# Patient Record
Sex: MALE | Race: WHITE | Employment: FULL TIME | URBAN - METROPOLITAN AREA
[De-identification: names, ages, dates, MRNs, and addresses within clinical notes are randomized per-mention and may not be internally consistent; named-entity substitution may affect disease eponyms.]

---

## 2020-04-07 ENCOUNTER — TRANSFERRED RECORDS (OUTPATIENT)
Dept: HEALTH INFORMATION MANAGEMENT | Facility: CLINIC | Age: 40
End: 2020-04-07

## 2020-10-06 ENCOUNTER — TRANSFERRED RECORDS (OUTPATIENT)
Dept: HEALTH INFORMATION MANAGEMENT | Facility: CLINIC | Age: 40
End: 2020-10-06

## 2021-04-08 ENCOUNTER — TRANSFERRED RECORDS (OUTPATIENT)
Dept: HEALTH INFORMATION MANAGEMENT | Facility: CLINIC | Age: 41
End: 2021-04-08

## 2021-04-14 ENCOUNTER — TRANSFERRED RECORDS (OUTPATIENT)
Dept: HEALTH INFORMATION MANAGEMENT | Facility: CLINIC | Age: 41
End: 2021-04-14

## 2021-04-15 ENCOUNTER — TRANSFERRED RECORDS (OUTPATIENT)
Dept: HEALTH INFORMATION MANAGEMENT | Facility: CLINIC | Age: 41
End: 2021-04-15

## 2021-04-15 LAB — TSH SERPL-ACNC: 2.58 UIU/ML (ref 0.35–4.94)

## 2021-04-16 LAB
ALT SERPL-CCNC: 50 IU/L (ref 8–45)
AST SERPL-CCNC: 30 IU/L (ref 2–40)
CREAT SERPL-MCNC: 0.87 MG/DL (ref 0.72–1.25)
GFR SERPL CREATININE-BSD FRML MDRD: >60 ML/MIN/1.73M2
HIV 1&2 EXT: NORMAL
POTASSIUM SERPL-SCNC: 4.3 MMOL/L (ref 3.5–5)

## 2021-04-17 ENCOUNTER — TRANSFERRED RECORDS (OUTPATIENT)
Dept: HEALTH INFORMATION MANAGEMENT | Facility: CLINIC | Age: 41
End: 2021-04-17

## 2021-04-17 LAB — GLUCOSE SERPL-MCNC: 57 MG/DL (ref 40–70)

## 2021-04-20 ENCOUNTER — TRANSFERRED RECORDS (OUTPATIENT)
Dept: HEALTH INFORMATION MANAGEMENT | Facility: CLINIC | Age: 41
End: 2021-04-20

## 2021-04-21 ENCOUNTER — TRANSFERRED RECORDS (OUTPATIENT)
Dept: HEALTH INFORMATION MANAGEMENT | Facility: CLINIC | Age: 41
End: 2021-04-21

## 2021-04-23 ENCOUNTER — TRANSFERRED RECORDS (OUTPATIENT)
Dept: HEALTH INFORMATION MANAGEMENT | Facility: CLINIC | Age: 41
End: 2021-04-23

## 2021-05-22 ENCOUNTER — TRANSFERRED RECORDS (OUTPATIENT)
Dept: HEALTH INFORMATION MANAGEMENT | Facility: CLINIC | Age: 41
End: 2021-05-22

## 2021-05-23 ENCOUNTER — PRE VISIT (OUTPATIENT)
Dept: NEUROLOGY | Facility: CLINIC | Age: 41
End: 2021-05-23

## 2021-05-23 NOTE — TELEPHONE ENCOUNTER
FUTURE VISIT INFORMATION      FUTURE VISIT INFORMATION:    Date: 6/4/2021    Time: 745am    [place] [facility] [place]:    Referring provider:  Self     Referring providers clinic:      Reason for visit/diagnosis  Numbness and Weakness     RECORDS REQUESTED FROM:       Clinic name Comments Records Status Imaging Status   Internal Dr. Jasso-4/19/2021, 4/14/2021 Epic N/A          [facility]-4/15/2021, 3/11/2021 Care Everywhere Requested to PACS                        5/23/2021-Voicemail left for [facility] to push IMAGES ASAP-MR @ 1034am    6/3/2021-2nd request for Images faxed to [facility]-MR @ 647am    6/4/2021-[facility] Images now in PACS, labs scanned to Chart-MR @ 438am

## 2021-05-25 ENCOUNTER — TRANSFERRED RECORDS (OUTPATIENT)
Dept: HEALTH INFORMATION MANAGEMENT | Facility: CLINIC | Age: 41
End: 2021-05-25

## 2021-05-26 ENCOUNTER — TRANSFERRED RECORDS (OUTPATIENT)
Dept: HEALTH INFORMATION MANAGEMENT | Facility: CLINIC | Age: 41
End: 2021-05-26

## 2021-06-02 ENCOUNTER — TRANSFERRED RECORDS (OUTPATIENT)
Dept: HEALTH INFORMATION MANAGEMENT | Facility: CLINIC | Age: 41
End: 2021-06-02

## 2021-06-04 ENCOUNTER — OFFICE VISIT (OUTPATIENT)
Dept: NEUROLOGY | Facility: CLINIC | Age: 41
End: 2021-06-04
Payer: COMMERCIAL

## 2021-06-04 VITALS
SYSTOLIC BLOOD PRESSURE: 125 MMHG | OXYGEN SATURATION: 97 % | HEIGHT: 72 IN | RESPIRATION RATE: 16 BRPM | HEART RATE: 63 BPM | WEIGHT: 179 LBS | DIASTOLIC BLOOD PRESSURE: 80 MMHG | BODY MASS INDEX: 24.24 KG/M2

## 2021-06-04 DIAGNOSIS — R47.1 DYSARTHRIA: Primary | ICD-10-CM

## 2021-06-04 DIAGNOSIS — R13.10 DYSPHAGIA, UNSPECIFIED TYPE: ICD-10-CM

## 2021-06-04 DIAGNOSIS — R20.0 NUMBNESS OF FACE: ICD-10-CM

## 2021-06-04 DIAGNOSIS — R53.1 LEFT-SIDED WEAKNESS: ICD-10-CM

## 2021-06-04 PROCEDURE — 99205 OFFICE O/P NEW HI 60 MIN: CPT | Performed by: PSYCHIATRY & NEUROLOGY

## 2021-06-04 RX ORDER — BENZONATATE 100 MG/1
100 CAPSULE ORAL
COMMUNITY
Start: 2021-05-12

## 2021-06-04 RX ORDER — QUETIAPINE 150 MG/1
150 TABLET, FILM COATED, EXTENDED RELEASE ORAL
COMMUNITY
Start: 2021-04-22

## 2021-06-04 RX ORDER — QUETIAPINE FUMARATE 300 MG/1
300 TABLET, FILM COATED ORAL
COMMUNITY
Start: 2021-04-22

## 2021-06-04 RX ORDER — HYDROCODONE BITARTRATE AND ACETAMINOPHEN 5; 325 MG/1; MG/1
1 TABLET ORAL
COMMUNITY
Start: 2021-06-02

## 2021-06-04 RX ORDER — METOPROLOL SUCCINATE 100 MG/1
100 TABLET, EXTENDED RELEASE ORAL
COMMUNITY
Start: 2021-05-12

## 2021-06-04 RX ORDER — LITHIUM CARBONATE 450 MG
900 TABLET, EXTENDED RELEASE ORAL
COMMUNITY
Start: 2021-03-11

## 2021-06-04 RX ORDER — ATORVASTATIN CALCIUM 20 MG/1
20 TABLET, FILM COATED ORAL
COMMUNITY
Start: 2021-05-01

## 2021-06-04 RX ORDER — DEXTROAMPHETAMINE SACCHARATE, AMPHETAMINE ASPARTATE MONOHYDRATE, DEXTROAMPHETAMINE SULFATE AND AMPHETAMINE SULFATE 3.75; 3.75; 3.75; 3.75 MG/1; MG/1; MG/1; MG/1
15 CAPSULE, EXTENDED RELEASE ORAL
COMMUNITY
Start: 2021-05-07 | End: 2021-06-06

## 2021-06-04 RX ORDER — LORAZEPAM 1 MG/1
1-2 TABLET ORAL
COMMUNITY
Start: 2021-04-05

## 2021-06-04 RX ORDER — DEXTROAMPHETAMINE SACCHARATE, AMPHETAMINE ASPARTATE MONOHYDRATE, DEXTROAMPHETAMINE SULFATE AND AMPHETAMINE SULFATE 6.25; 6.25; 6.25; 6.25 MG/1; MG/1; MG/1; MG/1
25 CAPSULE, EXTENDED RELEASE ORAL
COMMUNITY
Start: 2021-05-07 | End: 2021-06-06

## 2021-06-04 ASSESSMENT — MIFFLIN-ST. JEOR: SCORE: 1759.94

## 2021-06-04 ASSESSMENT — PAIN SCALES - GENERAL: PAINLEVEL: EXTREME PAIN (9)

## 2021-06-04 NOTE — PROGRESS NOTES
Service Date: 2021    Carlotta Rosenberg MD  [id number] [place]  [address]    RE:  Joe Poole  MRN:  [id number]  :  1980    Dear Carlotta:    Thank you for referring Joe Poole.  He is a 41-year-old male who presents with a number of symptoms including dysarthria, dysphagia, facial numbness and left sided weakness.  He is accompanied by his wife.    Prior to his visit with me this morning,  I did have a chance to review your records as well as extensive testing that has been done over the past few months.    He tells me in December he began experiencing pain in his ears.  He was prescribed eardrops for this.  Subsequent to this, he developed bilateral facial numbness that has persisted.  At the end of March, he had contracted COVID and indicates he was unable to walk.  He has also developed slurring of speech, dysphagia, weakness on the left side and left foot numbness.    Also, recently with no clear provocation, he developed a medial epicondyle fracture at the left elbow.    In addition to the above symptoms, his wife gave me detailed notes of other things he has been experiencing including ear pain and ringing, urinary urgency, low-grade fevers, eye pain, facial twitching, tongue tremor, arm and leg tremors, slowed thinking, choking on food, sexual dysfunction, tripping and falling requiring a cane for safe walking, cramps in his legs, numbness in his left foot, she reports he has had pulmonary function testing showing reduced inspiratory pressures.  He has been fatigued.  He has had mood instability.  He has had trouble manipulating things with his hands and he feels like he might pass out.    I do note he has had extensive and essentially unrevealing testing.      He had an unenhanced brain MRI scan in March which I reviewed, and aside from a few nonspecific T2 signal abnormalities, the study was normal.  He had a followup study done in April with contrast that  did not reveal any difference and no abnormal contrast enhancement was noted.    He had a lumbar puncture done, which was essentially normal.  He had 1 white cell.  His protein was 46, glucose 57.    Your report indicates he has had EMG testing that was negative for myopathy and neuropathy.  He indicates that the study was done on his right arm and right leg.    He had a video swallow study done on 04/15/2021.  It was reported his swallow was coordinated.  He coughed with each swallow, but there was no penetration or aspiration.  He had 1 episode of penetration for thin liquids that was likely due to reflux at the cessation of swallowing.    He has had extensive laboratory work, which was largely normal or negative.  This included a CBC, basic metabolic panel, liver profile, Lyme serology, treponema pallidum antibody, GARY, Sjogren antibodies, HIV, B12, TSH, sedimentation rate, CRP, ganglioside antibodies, paraneoplastic panel, and acetylcholine receptor antibodies.  On one occasion, he had a mildly elevated CK of 353.    His past history is notable for bipolar disorder, ADHD, hypertension, hypertriglyceridemia and methamphetamine abuse.  He has been sober for 8 years.    CURRENT MEDICATIONS:  Adderall, Lipitor, Tessalon, hydrocodone for the recent left elbow fracture, lithium, lorazepam, metoprolol, and quetiapine.  Aspirin that he had been taking is currently being held due to ecchymosis in the left arm.    PHYSICAL EXAMINATION:  Reveals a patient who is alert and cooperative.  Heart rate 63.  Blood pressure 125/80.  He has extensive bruising and swelling of the left forearm related to the recent fracture.    The patient does have a slight facial stare.  Pupils are equal, round and react well to light.  He has full extraocular motility.  He reports decreased touch and pin involving his entire face and up onto the forehead and the top of his head that could correspond to bilateral trigeminal distributions V1, V2 and  V3.  His facial strength is normal.  His speech is somewhat indistinct, but I cannot clearly say he is dysarthric.  His palate moves in the midline, but he has a diminished gag.  He has a tremor of his tongue, but no tongue atrophy or fasciculations.  Motor examination reveals normal right upper and right lower extremity strength.  It was difficult to test the left upper extremity because of the recent fracture, but he  with a reduced intensity with the left hand.  He has a tendency to give way when I test left lower extremity strength.  He is not ataxic.  He reports decreased vibratory sense in the toes on the left and decreased pinprick involving the entire left leg.  He misidentifies position change of his left great toe.  No limb tremor is appreciated.  There is no alteration of his muscle tone.  His gait is antalgic and he does use a cane.  Reflexes are 2+ in the upper extremities, 3+ in the knees and 2+ at the ankles.  Plantar responses are flexor.    IMPRESSION:    1.  Multiple neurologic symptoms including dysarthria, dysphagia, left sided weakness, left lower extremity sensory disturbance, bilateral trigeminal sensory disturbance, and tongue tremor.  2.  Extensive testing to date which has been negative.    PLAN:  I did inform the patient and his wife that at this juncture, I do not have a precise diagnosis for all of his symptoms.    His wife asked me about ALS, Guillain-[place] syndrome, and myasthenia gravis and I informed her that his history and examination, and testing to date would not support any of these diagnoses.    I have recommended repeating his brain MRI scan with contrast to look and see if we can find any evolving process perhaps in the brainstem or at the skull base that might explain some of his symptoms.    He is going to have a left lower extremity EMG examination to again look for any evidence of a peripheral nervous system process.  I am not going to study the left arm because of  the recent fracture.    I did tell them I may not be able to come up with a specific diagnosis.    I will be contacting the patient with test results.  I did tell them I am going to be retiring in a few weeks, and if I do not have the test results by then, one of my colleagues will be contacting them.    Total visit time was 65 minutes.  This included reviewing extensive medical records prior to his visit, history, examination, counseling, and documentation.    ADDENDUM 6/10/21: Head MRI unremarkable. Few T2 hyperintensities. No abnormal enhancement. Brainstem/ CN 5s look OK. Called patient.    Sincerely,    Adam Dunne MD        D: 2021   T: 2021   MT: robbin    Name:     JANET NEWTON  MRN:      [id number]        Account:      [id number]   :      1980           Service Date: 2021       Document: [id number]

## 2021-06-04 NOTE — NURSING NOTE
Chief Complaint   Patient presents with     Consult     [facility] - Dysarthia, dysphagia, numbness and weakness     Rajeev Narayan

## 2021-06-04 NOTE — LETTER
2021       RE: Joe Poole  [address] [facility] [address]     Dear Colleague,    Thank you for referring your patient, Joe Poole, to the [facility] [place] [facility]. Please see a copy of my visit note below.    Service Date: 2021    Carlotta Rosenberg MD  [address] [place]  [address]    RE:  Joe Poole  MRN:  [id number]  :  1980    Dear Carlotta:    Thank you for referring Joe Poole.  He is a 41-year-old male who presents with a number of symptoms including dysarthria, dysphagia, facial numbness and left sided weakness.  He is accompanied by his wife.    Prior to his visit with me this morning,  I did have a chance to review your records as well as extensive testing that has been done over the past few months.    He tells me in December he began experiencing pain in his ears.  He was prescribed eardrops for this.  Subsequent to this, he developed bilateral facial numbness that has persisted.  At the end of March, he had contracted COVID and indicates he was unable to walk.  He has also developed slurring of speech, dysphagia, weakness on the left side and left foot numbness.    Also, recently with no clear provocation, he developed a medial epicondyle fracture at the left elbow.    In addition to the above symptoms, his wife gave me detailed notes of other things he has been experiencing including ear pain and ringing, urinary urgency, low-grade fevers, eye pain, facial twitching, tongue tremor, arm and leg tremors, slowed thinking, choking on food, sexual dysfunction, tripping and falling requiring a cane for safe walking, cramps in his legs, numbness in his left foot, she reports he has had pulmonary function testing showing reduced inspiratory pressures.  He has been fatigued.  He has had mood instability.  He has had trouble manipulating things with his hands  and he feels like he might pass out.    I do note he has had extensive and essentially unrevealing testing.      He had an unenhanced brain MRI scan in March which I reviewed, and aside from a few nonspecific T2 signal abnormalities, the study was normal.  He had a followup study done in April with contrast that did not reveal any difference and no abnormal contrast enhancement was noted.    He had a lumbar puncture done, which was essentially normal.  He had 1 white cell.  His protein was 46, glucose 57.    Your report indicates he has had EMG testing that was negative for myopathy and neuropathy.  He indicates that the study was done on his right arm and right leg.    He had a video swallow study done on 04/15/2021.  It was reported his swallow was coordinated.  He coughed with each swallow, but there was no penetration or aspiration.  He had 1 episode of penetration for thin liquids that was likely due to reflux at the cessation of swallowing.    He has had extensive laboratory work, which was largely normal or negative.  This included a CBC, basic metabolic panel, liver profile, Lyme serology, treponema pallidum antibody, GARY, Sjogren antibodies, HIV, B12, TSH, sedimentation rate, CRP, ganglioside antibodies, paraneoplastic panel, and acetylcholine receptor antibodies.  On one occasion, he had a mildly elevated CK of 353.    His past history is notable for bipolar disorder, ADHD, hypertension, hypertriglyceridemia and methamphetamine abuse.  He has been sober for 8 years.    CURRENT MEDICATIONS:  Adderall, Lipitor, Tessalon, hydrocodone for the recent left elbow fracture, lithium, lorazepam, metoprolol, and quetiapine.  Aspirin that he had been taking is currently being held due to ecchymosis in the left arm.    PHYSICAL EXAMINATION:  Reveals a patient who is alert and cooperative.  Heart rate 63.  Blood pressure 125/80.  He has extensive bruising and swelling of the left forearm related to the recent  fracture.    The patient does have a slight facial stare.  Pupils are equal, round and react well to light.  He has full extraocular motility.  He reports decreased touch and pin involving his entire face and up onto the forehead and the top of his head that could correspond to bilateral trigeminal distributions V1, V2 and V3.  His facial strength is normal.  His speech is somewhat indistinct, but I cannot clearly say he is dysarthric.  His palate moves in the midline, but he has a diminished gag.  He has a tremor of his tongue, but no tongue atrophy or fasciculations.  Motor examination reveals normal right upper and right lower extremity strength.  It was difficult to test the left upper extremity because of the recent fracture, but he  with a reduced intensity with the left hand.  He has a tendency to give way when I test left lower extremity strength.  He is not ataxic.  He reports decreased vibratory sense in the toes on the left and decreased pinprick involving the entire left leg.  He misidentifies position change of his left great toe.  No limb tremor is appreciated.  There is no alteration of his muscle tone.  His gait is antalgic and he does use a cane.  Reflexes are 2+ in the upper extremities, 3+ in the knees and 2+ at the ankles.  Plantar responses are flexor.    IMPRESSION:    1.  Multiple neurologic symptoms including dysarthria, dysphagia, left sided weakness, left lower extremity sensory disturbance, bilateral trigeminal sensory disturbance, and tongue tremor.  2.  Extensive testing to date which has been negative.    PLAN:  I did inform the patient and his wife that at this juncture, I do not have a precise diagnosis for all of his symptoms.    His wife asked me about ALS, Guillain-[place] syndrome, and myasthenia gravis and I informed her that his history and examination, and testing to date would not support any of these diagnoses.    I have recommended repeating his brain MRI scan with  contrast to look and see if we can find any evolving process perhaps in the brainstem or at the skull base that might explain some of his symptoms.    He is going to have a left lower extremity EMG examination to again look for any evidence of a peripheral nervous system process.  I am not going to study the left arm because of the recent fracture.    I did tell them I may not be able to come up with a specific diagnosis.    I will be contacting the patient with test results.  I did tell them I am going to be retiring in a few weeks, and if I do not have the test results by then, one of my colleagues will be contacting them.    Total visit time was 65 minutes.  This included reviewing extensive medical records prior to his visit, history, examination, counseling, and documentation.    Sincerely,    Adam Dunne MD        D: 2021   T: 2021   MT: robbin    Name:     STANJOSEMANUELJANET  MRN:      [id number]        Account:      [id number]   :      1980           Service Date: 2021       Document: [id number]

## 2021-06-09 ENCOUNTER — ANCILLARY PROCEDURE (OUTPATIENT)
Dept: MRI IMAGING | Facility: CLINIC | Age: 41
End: 2021-06-09
Attending: PSYCHIATRY & NEUROLOGY
Payer: COMMERCIAL

## 2021-06-09 DIAGNOSIS — R13.10 DYSPHAGIA, UNSPECIFIED TYPE: ICD-10-CM

## 2021-06-09 DIAGNOSIS — R20.0 NUMBNESS OF FACE: ICD-10-CM

## 2021-06-09 DIAGNOSIS — R47.1 DYSARTHRIA: ICD-10-CM

## 2021-06-09 DIAGNOSIS — R53.1 LEFT-SIDED WEAKNESS: ICD-10-CM

## 2021-06-09 PROCEDURE — A9585 GADOBUTROL INJECTION: HCPCS | Mod: JW | Performed by: RADIOLOGY

## 2021-06-09 PROCEDURE — 70553 MRI BRAIN STEM W/O & W/DYE: CPT | Mod: TC | Performed by: RADIOLOGY

## 2021-06-09 RX ORDER — GADOBUTROL 604.72 MG/ML
10 INJECTION INTRAVENOUS ONCE
Status: COMPLETED | OUTPATIENT
Start: 2021-06-09 | End: 2021-06-09

## 2021-06-09 RX ADMIN — GADOBUTROL 8 ML: 604.72 INJECTION INTRAVENOUS at 15:45

## 2022-07-27 ENCOUNTER — TRANSFERRED RECORDS (OUTPATIENT)
Dept: HEALTH INFORMATION MANAGEMENT | Facility: CLINIC | Age: 42
End: 2022-07-27

## 2022-07-29 ENCOUNTER — TRANSCRIBE ORDERS (OUTPATIENT)
Dept: OTHER | Age: 42
End: 2022-07-29

## 2022-07-29 DIAGNOSIS — R53.1 WEAKNESS: Primary | ICD-10-CM

## 2022-10-09 ENCOUNTER — HEALTH MAINTENANCE LETTER (OUTPATIENT)
Age: 42
End: 2022-10-09

## 2023-10-28 ENCOUNTER — HEALTH MAINTENANCE LETTER (OUTPATIENT)
Age: 43
End: 2023-10-28